# Patient Record
Sex: FEMALE | URBAN - METROPOLITAN AREA
[De-identification: names, ages, dates, MRNs, and addresses within clinical notes are randomized per-mention and may not be internally consistent; named-entity substitution may affect disease eponyms.]

---

## 2020-03-16 ENCOUNTER — APPOINTMENT (OUTPATIENT)
Dept: INTERPRETER SERVICES | Facility: CLINIC | Age: 35
End: 2020-03-16
Payer: COMMERCIAL

## 2020-03-17 ENCOUNTER — PRE VISIT (OUTPATIENT)
Dept: MATERNAL FETAL MEDICINE | Facility: CLINIC | Age: 35
End: 2020-03-17

## 2020-03-18 ENCOUNTER — APPOINTMENT (OUTPATIENT)
Dept: INTERPRETER SERVICES | Facility: CLINIC | Age: 35
End: 2020-03-18
Payer: COMMERCIAL

## 2020-03-19 ENCOUNTER — HOSPITAL ENCOUNTER (OUTPATIENT)
Dept: ULTRASOUND IMAGING | Facility: CLINIC | Age: 35
End: 2020-03-19
Attending: OBSTETRICS & GYNECOLOGY
Payer: COMMERCIAL

## 2020-03-19 ENCOUNTER — OFFICE VISIT (OUTPATIENT)
Dept: MATERNAL FETAL MEDICINE | Facility: CLINIC | Age: 35
End: 2020-03-19
Attending: OBSTETRICS & GYNECOLOGY
Payer: COMMERCIAL

## 2020-03-19 DIAGNOSIS — O09.521 MULTIGRAVIDA OF ADVANCED MATERNAL AGE IN FIRST TRIMESTER: Primary | ICD-10-CM

## 2020-03-19 DIAGNOSIS — O26.90 PREGNANCY RELATED CONDITION, ANTEPARTUM: ICD-10-CM

## 2020-03-19 DIAGNOSIS — O09.521 SUPERVISION OF ELDERLY MULTIGRAVIDA IN FIRST TRIMESTER: Primary | ICD-10-CM

## 2020-03-19 PROCEDURE — 76813 OB US NUCHAL MEAS 1 GEST: CPT

## 2020-03-19 PROCEDURE — 96040 ZZH GENETIC COUNSELING, EACH 30 MINUTES: CPT | Mod: ZF | Performed by: GENETIC COUNSELOR, MS

## 2020-03-19 NOTE — PROGRESS NOTES
[facility]  Genetic Counseling Consult    Patient: Tha Johns YOB: 1985   Date of Service: 3/19/20      Tha Johns was seen at [facility] for genetic consultation to discuss the options for screening and testing for fetal chromosome abnormalities. The indication for genetic counseling is advanced maternal age.     The encounter was conducted with a phone Somalian  ( NYDIA Lepe) due to the patient speaking limited english       Impression/Plan:   1.  Tha had a nuchal translucency ultrasound only. Tha may be interested in screening at some point, but declined today.    2.  Maternal serum AFP (single marker screen) is recommended after 15 weeks to screen for open neural tube defects.     3.  An 18-20 week comprehensive ultrasound is standard of care for all women 35 or older at delivery.    Pregnancy History:   /Parity:     Age at Delivery: 35 year old  HOLLY: 2020, by Last Menstrual Period  Gestational Age: 13w3d    No significant complications or exposures were reported in the current pregnancy.    Tha hansen pregnancy history is significant for five full term pregnancies with no reported complications, and one miscarriage estimated around 8 weeks.     Medical History:   Tha hansen reported medical history is not expected to impact pregnancy management or risks to fetal development.       Family History:   A complete three-generation pedigree was not obtained.     However, the following most significant findings were reported by Tha:    Johns pregnancies have all been with her current partner, he is 33 and healthy    Tha's children are all healthy    Tha had two siblings die in [facility]. She believes one was around two years of age and the other four years. She is unsure of their cause of death. She has many health siblings and also have healthy children.     Otherwise, the reported family  history is negative for multiple miscarriages, stillbirths, birth defects, intellectual disability, birth defects, known genetic conditions, vision or hearing loss, infant deaths, cancer under age of 50 and consanguinity.       Carrier Screening:   Carrier screening was beyond the scope of our discussion today.        Risk Assessment for Chromosome Conditions:   We explained that the risk for fetal chromosome abnormalities increases with maternal age. We discussed specific features of common chromosome abnormalities, including Down syndrome, trisomy 13, trisomy 18, and sex chromosome trisomies.      At age 35 at midtrimester, the risk to have a baby with Down syndrome is 1 in 216.     At age 35 at midtrimester, the risk to have a baby with any chromosome abnormality is 1 in 105.     Testing Options:   We discussed the following options:   Non-invasive Prenatal Testing (NIPT)    Maternal plasma cell-free DNA testing; first trimester ultrasound with nuchal translucency and nasal bone assessment is recommended, when appropriate    Screens for fetal trisomy 21, trisomy 13, trisomy 18, and sex chromosome aneuploidy    Cannot screen for open neural tube defects; maternal serum AFP after 15 weeks is recommended     Comprehensive (Level II) ultrasound: Detailed ultrasound performed between 18-22 weeks gestation to screen for major birth defects and markers for aneuploidy.    We reviewed the benefits and limitations of this testing.  Screening tests provide a risk assessment specific to the pregnancy for certain fetal chromosome abnormalities, but cannot definitively diagnose or exclude a fetal chromosome abnormality.  Follow-up genetic counseling and consideration of diagnostic testing is recommended with any abnormal screening result.     Diagnostic tests carry inherent risks- including risk of miscarriage- that require careful consideration.  These tests can detect fetal chromosome abnormalities with greater than 99%  certainty.  Results can be compromised by maternal cell contamination or mosaicism, and are limited by the resolution of cytogenetic G-banding technology.  There is no screening nor diagnostic test that can detect all forms of birth defects or mental disability.     It was a pleasure to be involved with Tha s care. Face-to-face time of the meeting was 25 minutes.    Patricia Mcgowan MS, [facility]  Licensed Genetic Counselor   [facility]  Maternal Fetal Medicine  [email or web address]  Office: [phone]  Pager [phone]  M: [phone]   Fax: [phone]

## 2020-03-19 NOTE — PROGRESS NOTES
"Please see \"Imaging\" tab under \"Chart Review\" for details of today's US at the [facility].    Jose Rodriguez MD  Maternal-Fetal Medicine      "

## 2025-05-03 ENCOUNTER — HOSPITAL ENCOUNTER (EMERGENCY)
Facility: CLINIC | Age: 40
Discharge: HOME | End: 2025-05-03
Attending: EMERGENCY MEDICINE | Admitting: EMERGENCY MEDICINE
Payer: COMMERCIAL

## 2025-05-03 ENCOUNTER — APPOINTMENT (OUTPATIENT)
Dept: ULTRASOUND IMAGING | Facility: CLINIC | Age: 40
End: 2025-05-03
Attending: EMERGENCY MEDICINE
Payer: COMMERCIAL

## 2025-05-03 VITALS
HEART RATE: 64 BPM | RESPIRATION RATE: 16 BRPM | DIASTOLIC BLOOD PRESSURE: 78 MMHG | TEMPERATURE: 97.9 F | WEIGHT: 188 LBS | SYSTOLIC BLOOD PRESSURE: 111 MMHG | OXYGEN SATURATION: 100 %

## 2025-05-03 DIAGNOSIS — R10.30 LOWER ABDOMINAL PAIN: ICD-10-CM

## 2025-05-03 DIAGNOSIS — O03.9 MISCARRIAGE: ICD-10-CM

## 2025-05-03 LAB
ALBUMIN SERPL BCG-MCNC: 3.9 G/DL (ref 3.5–5.2)
ALBUMIN UR-MCNC: NEGATIVE MG/DL
ALP SERPL-CCNC: 94 U/L (ref 40–150)
ALT SERPL W P-5'-P-CCNC: 17 U/L (ref 0–50)
ANION GAP SERPL CALCULATED.3IONS-SCNC: 9 MMOL/L (ref 7–15)
APPEARANCE UR: CLEAR
AST SERPL W P-5'-P-CCNC: 22 U/L (ref 0–45)
BACTERIA #/AREA URNS HPF: ABNORMAL /HPF
BASOPHILS # BLD AUTO: 0 10E3/UL (ref 0–0.2)
BASOPHILS NFR BLD AUTO: 1 %
BILIRUB SERPL-MCNC: 0.5 MG/DL
BILIRUB UR QL STRIP: NEGATIVE
BUN SERPL-MCNC: 9.4 MG/DL (ref 6–20)
CALCIUM SERPL-MCNC: 9.1 MG/DL (ref 8.8–10.4)
CHLORIDE SERPL-SCNC: 103 MMOL/L (ref 98–107)
COLOR UR AUTO: ABNORMAL
CREAT SERPL-MCNC: 0.49 MG/DL (ref 0.51–0.95)
EGFRCR SERPLBLD CKD-EPI 2021: >90 ML/MIN/1.73M2
EOSINOPHIL # BLD AUTO: 0.1 10E3/UL (ref 0–0.7)
EOSINOPHIL NFR BLD AUTO: 2 %
ERYTHROCYTE [DISTWIDTH] IN BLOOD BY AUTOMATED COUNT: 16.3 % (ref 10–15)
GLUCOSE SERPL-MCNC: 92 MG/DL (ref 70–99)
GLUCOSE UR STRIP-MCNC: NEGATIVE MG/DL
HCG INTACT+B SERPL-ACNC: 28 MIU/ML
HCO3 SERPL-SCNC: 24 MMOL/L (ref 22–29)
HCT VFR BLD AUTO: 32.3 % (ref 35–47)
HGB BLD-MCNC: 10 G/DL (ref 11.7–15.7)
HGB UR QL STRIP: NEGATIVE
IMM GRANULOCYTES # BLD: 0 10E3/UL
IMM GRANULOCYTES NFR BLD: 0 %
KETONES UR STRIP-MCNC: NEGATIVE MG/DL
LEUKOCYTE ESTERASE UR QL STRIP: ABNORMAL
LYMPHOCYTES # BLD AUTO: 1.5 10E3/UL (ref 0.8–5.3)
LYMPHOCYTES NFR BLD AUTO: 46 %
MCH RBC QN AUTO: 25.8 PG (ref 26.5–33)
MCHC RBC AUTO-ENTMCNC: 31 G/DL (ref 31.5–36.5)
MCV RBC AUTO: 83 FL (ref 78–100)
MONOCYTES # BLD AUTO: 0.4 10E3/UL (ref 0–1.3)
MONOCYTES NFR BLD AUTO: 12 %
MUCOUS THREADS #/AREA URNS LPF: PRESENT /LPF
NEUTROPHILS # BLD AUTO: 1.3 10E3/UL (ref 1.6–8.3)
NEUTROPHILS NFR BLD AUTO: 40 %
NITRATE UR QL: NEGATIVE
NRBC # BLD AUTO: 0 10E3/UL
NRBC BLD AUTO-RTO: 0 /100
PH UR STRIP: 7 [PH] (ref 5–7)
PLATELET # BLD AUTO: 256 10E3/UL (ref 150–450)
POTASSIUM SERPL-SCNC: 3.6 MMOL/L (ref 3.4–5.3)
PROT SERPL-MCNC: 7.2 G/DL (ref 6.4–8.3)
RBC # BLD AUTO: 3.88 10E6/UL (ref 3.8–5.2)
RBC URINE: 1 /HPF
SODIUM SERPL-SCNC: 136 MMOL/L (ref 135–145)
SP GR UR STRIP: 1.01 (ref 1–1.03)
SQUAMOUS EPITHELIAL: 5 /HPF
UROBILINOGEN UR STRIP-MCNC: NORMAL MG/DL
WBC # BLD AUTO: 3.3 10E3/UL (ref 4–11)
WBC URINE: 4 /HPF

## 2025-05-03 PROCEDURE — 250N000011 HC RX IP 250 OP 636: Mod: JZ | Performed by: EMERGENCY MEDICINE

## 2025-05-03 PROCEDURE — 82435 ASSAY OF BLOOD CHLORIDE: CPT | Performed by: EMERGENCY MEDICINE

## 2025-05-03 PROCEDURE — 96374 THER/PROPH/DIAG INJ IV PUSH: CPT | Performed by: EMERGENCY MEDICINE

## 2025-05-03 PROCEDURE — 76801 OB US < 14 WKS SINGLE FETUS: CPT

## 2025-05-03 PROCEDURE — 84702 CHORIONIC GONADOTROPIN TEST: CPT | Performed by: EMERGENCY MEDICINE

## 2025-05-03 PROCEDURE — 81001 URINALYSIS AUTO W/SCOPE: CPT | Performed by: EMERGENCY MEDICINE

## 2025-05-03 PROCEDURE — 36415 COLL VENOUS BLD VENIPUNCTURE: CPT | Performed by: EMERGENCY MEDICINE

## 2025-05-03 PROCEDURE — 99285 EMERGENCY DEPT VISIT HI MDM: CPT | Mod: 25 | Performed by: EMERGENCY MEDICINE

## 2025-05-03 PROCEDURE — 99284 EMERGENCY DEPT VISIT MOD MDM: CPT | Performed by: EMERGENCY MEDICINE

## 2025-05-03 PROCEDURE — 85025 COMPLETE CBC W/AUTO DIFF WBC: CPT | Performed by: EMERGENCY MEDICINE

## 2025-05-03 RX ORDER — KETOROLAC TROMETHAMINE 15 MG/ML
15 INJECTION, SOLUTION INTRAMUSCULAR; INTRAVENOUS ONCE
Status: COMPLETED | OUTPATIENT
Start: 2025-05-03 | End: 2025-05-03

## 2025-05-03 RX ADMIN — KETOROLAC TROMETHAMINE 15 MG: 15 INJECTION, SOLUTION INTRAMUSCULAR; INTRAVENOUS at 16:18

## 2025-05-03 ASSESSMENT — ACTIVITIES OF DAILY LIVING (ADL)
ADLS_ACUITY_SCORE: 41

## 2025-05-03 ASSESSMENT — COLUMBIA-SUICIDE SEVERITY RATING SCALE - C-SSRS
2. HAVE YOU ACTUALLY HAD ANY THOUGHTS OF KILLING YOURSELF IN THE PAST MONTH?: NO
6. HAVE YOU EVER DONE ANYTHING, STARTED TO DO ANYTHING, OR PREPARED TO DO ANYTHING TO END YOUR LIFE?: NO
1. IN THE PAST MONTH, HAVE YOU WISHED YOU WERE DEAD OR WISHED YOU COULD GO TO SLEEP AND NOT WAKE UP?: NO

## 2025-05-03 NOTE — DISCHARGE INSTRUCTIONS
I have placed a referral for an OB/GYN appointment.  You will need a repeat laboratory study called the hCG.  Your hCG today is 28.  It is important to make sure that this value is decreasing to make sure your miscarriage is completed.  Use ibuprofen with food or Tylenol as needed for abdominal pain.  Heating pad to the abdomen may help.  return if you develop fevers or worsening symptoms

## 2025-05-03 NOTE — ED PROVIDER NOTES
"       [facility] ([facility])    25      ED PROVIDER NOTE     History     Chief Complaint   Patient presents with    Pelvic Pain     The history is provided by the patient and medical records. A  was used (Niuean).     Tha Johns is a 40 year old female  who presents to the emergency department for evaluation of abdominal pain. Patient estimates she was 8 weeks pregnant when she had a miscarriage on 2025-  11 days ago. She reports experiencing abdominal pain and vaginal bleeding during that time. She presents to the ED today (5/3/2025) for worsening abdominal pain radiating towards her back. She describes the abdominal pain as a \"squeezing\" pain and it is worse on the left side.. She has not been eating much due to the pain but states she has been drinking fluids. She has been taking tylenol as needed for pain. She reports slight dizziness. She denies current vaginal bleeding. She has not been evaluated since the miscarriage. The patient has 8 children. She reports a history of 10 pregnancies and 2 miscarriages. Denies fever, nausea, or vomiting. Patient has O positive blood type.     Past Medical History  History reviewed. No pertinent past medical history.  History reviewed. No pertinent surgical history.  No current outpatient medications on file.    No Known Allergies  Family History  No family history on file.  Social History       A complete review of systems was performed with pertinent positives and negatives noted in the HPI, and all other systems negative.    Physical Exam   BP: 111/78  Pulse: 64  Temp: 97.9  F (36.6  C)  Resp: 16  Weight: 85.3 kg (188 lb)  SpO2: 100 %/78   Pulse 64   Temp 97.9  F (36.6  C) (Oral)   Resp 16   Wt 85.3 kg (188 lb)   LMP 2025   SpO2 100%    Physical Exam  Physical Exam   Constitutional:   well nourished, well developed, resting comfortably   HENT:   Head: Normocephalic and atraumatic. "   Eyes: Conjunctivae are normal. Pupils are equal, round, and reactive to light.   Cardiovascular: regular rate and rhythm without murmurs or gallops  Pulmonary/Chest: Clear to auscultation bilaterally, with no wheezes or retractions. No respiratory distress.  GI: Soft with good bowel sounds.  Tenderness to the lower abdomen, especially left lower quadrant, no guarding, no rebound.   Back:  No bony or CVA tenderness   Musculoskeletal:  no edema  Skin: Skin is warm and dry. No rash noted.   Neurological: alert and oriented to person, place, and time. Nonfocal exam  Psychiatric:  normal mood and affect.     ED Course, Procedures, & Data      Procedures                Results for orders placed or performed during the hospital encounter of 05/03/25   US OB < 14 Weeks Single     Status: None    Narrative    EXAM: US OB < 14 WEEKS SINGLE-TRANSABDOMINAL  LOCATION: [facility]  DATE: 5/3/2025    INDICATION: abdl and back pain. s p miscarriage on 4 23 25 per patient report  COMPARISON: None.  TECHNIQUE: Transabdominal scans were performed. Patient declined the transvaginal portion of the examination.    FINDINGS:  UTERUS: Heterogeneous uterus. Endometrium measures 1.4 cm. No intrauterine gestational sac.    RIGHT OVARY: Normal.  LEFT OVARY: Normal.      Impression    IMPRESSION:   1.  No intrauterine gestational sac is identified. Recommend correlation with serial beta-hCG and/ultrasound as clinically indicated.       Comprehensive metabolic panel     Status: Abnormal   Result Value Ref Range    Sodium 136 135 - 145 mmol/L    Potassium 3.6 3.4 - 5.3 mmol/L    Carbon Dioxide (CO2) 24 22 - 29 mmol/L    Anion Gap 9 7 - 15 mmol/L    Urea Nitrogen 9.4 6.0 - 20.0 mg/dL    Creatinine 0.49 (L) 0.51 - 0.95 mg/dL    GFR Estimate >90 >60 mL/min/1.73m2    Calcium 9.1 8.8 - 10.4 mg/dL    Chloride 103 98 - 107 mmol/L    Glucose 92 70 - 99 mg/dL    Alkaline Phosphatase 94 40 - 150 U/L    AST 22  0 - 45 U/L    ALT 17 0 - 50 U/L    Protein Total 7.2 6.4 - 8.3 g/dL    Albumin 3.9 3.5 - 5.2 g/dL    Bilirubin Total 0.5 <=1.2 mg/dL   HCG quantitative pregnancy (blood)     Status: Abnormal   Result Value Ref Range    hCG Quantitative 28 (H) <5 mIU/mL   CBC with platelets and differential     Status: Abnormal   Result Value Ref Range    WBC Count 3.3 (L) 4.0 - 11.0 10e3/uL    RBC Count 3.88 3.80 - 5.20 10e6/uL    Hemoglobin 10.0 (L) 11.7 - 15.7 g/dL    Hematocrit 32.3 (L) 35.0 - 47.0 %    MCV 83 78 - 100 fL    MCH 25.8 (L) 26.5 - 33.0 pg    MCHC 31.0 (L) 31.5 - 36.5 g/dL    RDW 16.3 (H) 10.0 - 15.0 %    Platelet Count 256 150 - 450 10e3/uL    % Neutrophils 40 %    % Lymphocytes 46 %    % Monocytes 12 %    % Eosinophils 2 %    % Basophils 1 %    % Immature Granulocytes 0 %    NRBCs per 100 WBC 0 <1 /100    Absolute Neutrophils 1.3 (L) 1.6 - 8.3 10e3/uL    Absolute Lymphocytes 1.5 0.8 - 5.3 10e3/uL    Absolute Monocytes 0.4 0.0 - 1.3 10e3/uL    Absolute Eosinophils 0.1 0.0 - 0.7 10e3/uL    Absolute Basophils 0.0 0.0 - 0.2 10e3/uL    Absolute Immature Granulocytes 0.0 <=0.4 10e3/uL    Absolute NRBCs 0.0 10e3/uL   UA with Microscopic reflex to Culture     Status: Abnormal    Specimen: Urine, Clean Catch   Result Value Ref Range    Color Urine Light Yellow Colorless, Straw, Light Yellow, Yellow    Appearance Urine Clear Clear    Glucose Urine Negative Negative mg/dL    Bilirubin Urine Negative Negative    Ketones Urine Negative Negative mg/dL    Specific Gravity Urine 1.010 1.003 - 1.035    Blood Urine Negative Negative    pH Urine 7.0 5.0 - 7.0    Protein Albumin Urine Negative Negative mg/dL    Urobilinogen Urine Normal Normal mg/dL    Nitrite Urine Negative Negative    Leukocyte Esterase Urine Trace (A) Negative    Bacteria Urine Few (A) None Seen /HPF    Mucus Urine Present (A) None Seen /LPF    RBC Urine 1 <=2 /HPF    WBC Urine 4 <=5 /HPF    Squamous Epithelials Urine 5 (H) <=1 /HPF    Narrative    Urine Culture  not indicated   CBC with platelets differential     Status: Abnormal    Narrative    The following orders were created for panel order CBC with platelets differential.  Procedure                               Abnormality         Status                     ---------                               -----------         ------                     CBC with platelets and ...[[id number]]  Abnormal            Final result                 Please view results for these tests on the individual orders.     Medications   ketorolac (TORADOL) injection 15 mg (15 mg Intravenous $Given 5/3/25 5386)     Labs Ordered and Resulted from Time of ED Arrival to Time of ED Departure   COMPREHENSIVE METABOLIC PANEL - Abnormal       Result Value    Sodium 136      Potassium 3.6      Carbon Dioxide (CO2) 24      Anion Gap 9      Urea Nitrogen 9.4      Creatinine 0.49 (*)     GFR Estimate >90      Calcium 9.1      Chloride 103      Glucose 92      Alkaline Phosphatase 94      AST 22      ALT 17      Protein Total 7.2      Albumin 3.9      Bilirubin Total 0.5     HCG QUANTITATIVE PREGNANCY - Abnormal    hCG Quantitative 28 (*)    CBC WITH PLATELETS AND DIFFERENTIAL - Abnormal    WBC Count 3.3 (*)     RBC Count 3.88      Hemoglobin 10.0 (*)     Hematocrit 32.3 (*)     MCV 83      MCH 25.8 (*)     MCHC 31.0 (*)     RDW 16.3 (*)     Platelet Count 256      % Neutrophils 40      % Lymphocytes 46      % Monocytes 12      % Eosinophils 2      % Basophils 1      % Immature Granulocytes 0      NRBCs per 100 WBC 0      Absolute Neutrophils 1.3 (*)     Absolute Lymphocytes 1.5      Absolute Monocytes 0.4      Absolute Eosinophils 0.1      Absolute Basophils 0.0      Absolute Immature Granulocytes 0.0      Absolute NRBCs 0.0     ROUTINE UA WITH MICROSCOPIC REFLEX TO CULTURE - Abnormal    Color Urine Light Yellow      Appearance Urine Clear      Glucose Urine Negative      Bilirubin Urine Negative      Ketones Urine Negative      Specific Gravity Urine 1.010       Blood Urine Negative      pH Urine 7.0      Protein Albumin Urine Negative      Urobilinogen Urine Normal      Nitrite Urine Negative      Leukocyte Esterase Urine Trace (*)     Bacteria Urine Few (*)     Mucus Urine Present (*)     RBC Urine 1      WBC Urine 4      Squamous Epithelials Urine 5 (*)      US OB < 14 Weeks Single   Final Result   IMPRESSION:    1.  No intrauterine gestational sac is identified. Recommend correlation with serial beta-hCG and/ultrasound as clinically indicated.                   Critical care was not performed.     Medical Decision Making  The patient's presentation was of moderate complexity (an undiagnosed new problem with uncertain prognosis).    The patient's evaluation involved:  ordering and/or review of 3+ test(s) in this encounter (see separate area of note for details)  independent interpretation of testing performed by another health professional (I independently reviewed the OB ultrasound)  discussion of management or test interpretation with another health professional (OB/GYN)    The patient's management necessitated moderate risk (prescription drug management including medications given in the ED).    Assessment & Plan        I have reviewed the nursing notes.  Emergency Department course:  The patient was seen and examined at 1250 pm in room 6. .    Laboratory studies show an elevated hCG of 28..  Blood type is O+  CBC shows leukopenia, with a WBC of 3.3, anemia, with a hemoglobin of 10 and a normal platelet count of 256.  Comprehensive metabolic panel shows a slightly low creatinine of 0.49 and is otherwise unremarkable.  UA shows LCE but is contaminated with squamous epithelial cells, making UTI unlikely.  OB ultrasound shows IMPRESSION:   1.  No intrauterine gestational sac is identified. Recommend correlation with serial beta-hCG and/ultrasound as clinically indicated.  Please note that the patient declined the transvaginal portion of this ultrasound.    I treated  the patient with Toradol IV for pain.  I also spoke with OB/GYN regarding this patient.  She is nontoxic-appearing with normal vital signs.  Her OB ultrasound does not show an intrauterine gestational sac although the transvaginal portion was not performed.    Her hCG is 28 which is consistent with a declining hCG of a spontaneous miscarriage.  However, she needs close follow-up for repeat hCG and reevaluation.    I recommend taking ibuprofen with food or Tylenol as needed for pain.  A heating pad to her abdomen may help with pain.  I have placed an OB/GYN consult for close follow-up and she should be seen in follow-up in 2 to 3 days.  I also discussed reasons to return to the emergency department, including fevers, nausea or vomiting, worsening pain or other concerns.      I have reviewed the findings, diagnosis, plan and need for follow up with the patient.    New Prescriptions    No medications on file       Final diagnoses:   Lower abdominal pain   Miscarriage   IKajal, am serving as a trained medical scribe to document services personally performed by Sadie Oglesby MD, based on the provider's statements to me.     I, Sadie Oglesby MD, was physically present and have reviewed and verified the accuracy of this note documented by Kajal Gr.   This note was created in part by the use of Dragon voice recognition dictation system. Inadvertent grammatical errors and typographical errors may still exist.  MD Sadie Guerrero MD   [facility] EMERGENCY DEPARTMENT  5/3/2025     Sadie Oglesby MD  05/03/25 8258

## 2025-05-03 NOTE — ED TRIAGE NOTES
Triage Assessment (Adult)       Row Name 05/03/25 1242          Triage Assessment    Airway WDL WDL        Respiratory WDL    Respiratory WDL WDL        Skin Circulation/Temperature WDL    Skin Circulation/Temperature WDL WDL        Peripheral/Neurovascular WDL    Peripheral Neurovascular WDL WDL

## 2025-05-03 NOTE — ED TRIAGE NOTES
Via language line services     Pt states I had a miscarriage at 8 weeks gestation 1.5 weeks ago pt states she was bleeding a lot now she had a lot of pain on the L side of her abd and diffuse lower back. She states she had the pain when the miscarriage started but the pain is worse. She states the vaginal bleeding stopped 8 days ago, Friday 4/25.  Current pain level is 6/10.  Pt states she has some dizziness from time to time other denies any other sx.

## 2025-05-05 ENCOUNTER — APPOINTMENT (OUTPATIENT)
Dept: INTERPRETER SERVICES | Facility: CLINIC | Age: 40
End: 2025-05-05
Payer: COMMERCIAL

## 2025-05-05 ENCOUNTER — TELEPHONE (OUTPATIENT)
Dept: OBGYN | Facility: CLINIC | Age: 40
End: 2025-05-05
Payer: COMMERCIAL

## 2025-05-05 NOTE — TELEPHONE ENCOUNTER
LM on VM for patient- attempted to use , but unable to reach VM with - they stated that someone picked up the line but didn't answer x 2. VM in English.      Marielena Parra MD P s Rn-[facility]  Replies will be sent to [facility] [place] [facility]  Hello,  This patient was seen in the ED today and had a recent miscarriage. ED provider asked that we see her in clinic to follow up her beta hCG since it is still positive. Can you schedule her for a repeat beta hCG draw and clinic appointment?    Thanks!

## 2025-05-06 ENCOUNTER — TELEPHONE (OUTPATIENT)
Dept: OBGYN | Facility: CLINIC | Age: 40
End: 2025-05-06
Payer: COMMERCIAL

## 2025-05-06 NOTE — TELEPHONE ENCOUNTER
----- Message from Marielena Parra sent at 5/3/2025  5:55 PM CDT -----  Regarding: Clinic appt  Hello,  This patient was seen in the ED today and had a recent miscarriage. ED provider asked that we see her in clinic to follow up her beta hCG since it is still positive. Can you schedule her for a repeat beta hCG draw and clinic appointment?    Thanks!

## 2025-05-06 NOTE — TELEPHONE ENCOUNTER
Tried to reach Tha with assist of [facility] , but received voicemail.  Left message to call back.     Impaired functional mobility due to decreased strength and endurance

## 2025-05-07 NOTE — TELEPHONE ENCOUNTER
Tried to reach Tha with assist of [facility] , but received voicemail.  Left message to call back.

## 2025-05-15 ENCOUNTER — TELEPHONE (OUTPATIENT)
Dept: OBGYN | Facility: CLINIC | Age: 40
End: 2025-05-15
Payer: COMMERCIAL

## 2025-05-15 NOTE — TELEPHONE ENCOUNTER
Third and final attempt.  Tried to reach Tha with assist of [place] , but received voicemail.  Left message to call back to schedule.

## 2025-05-15 NOTE — TELEPHONE ENCOUNTER
----- Message from Agustina DELGADO sent at 5/7/2025  9:31 AM CDT -----  Regarding: FW: Clinic appt    ----- Message -----  From: Agustina Hutchins RN  Sent: 5/7/2025  12:00 AM CDT  To: s Rn-[facility]  Subject: FW: Clinic appt                                    ----- Message -----  From: Marielena Parra MD  Sent: 5/3/2025   5:56 PM CDT  To: s Rn-[facility]  Subject: Clinic appt                                      Hello,  This patient was seen in the ED today and had a recent miscarriage. ED provider asked that we see her in clinic to follow up her beta hCG since it is still positive. Can you schedule her for a repeat beta hCG draw and clinic appointment?    Thanks!